# Patient Record
Sex: MALE | Race: WHITE | URBAN - METROPOLITAN AREA
[De-identification: names, ages, dates, MRNs, and addresses within clinical notes are randomized per-mention and may not be internally consistent; named-entity substitution may affect disease eponyms.]

---

## 2022-09-14 ENCOUNTER — TELEPHONE (OUTPATIENT)
Dept: PSYCHIATRY | Facility: CLINIC | Age: 67
End: 2022-09-14

## 2022-09-14 NOTE — TELEPHONE ENCOUNTER
Client left message reporting that he lost his Celexa and needs replacement  Reached out to pharmacy as client should be due for refills right about now anyways-pharmacy confirmed he is due and they have a RF on file they can prepare for him  Left vm to make client aware

## 2022-10-10 RX ORDER — GABAPENTIN 600 MG/1
600 TABLET ORAL 3 TIMES DAILY
COMMUNITY
End: 2022-10-13 | Stop reason: SDUPTHER

## 2022-10-10 RX ORDER — DOXEPIN HYDROCHLORIDE 75 MG/1
75 CAPSULE ORAL
COMMUNITY
End: 2022-10-13 | Stop reason: SDUPTHER

## 2022-10-10 RX ORDER — CITALOPRAM 40 MG/1
40 TABLET ORAL DAILY
COMMUNITY
End: 2022-10-13 | Stop reason: SDUPTHER

## 2022-10-10 RX ORDER — BUPROPION HYDROCHLORIDE 300 MG/1
300 TABLET ORAL DAILY
COMMUNITY
End: 2022-10-13 | Stop reason: SDUPTHER

## 2022-10-13 ENCOUNTER — TELEMEDICINE (OUTPATIENT)
Dept: PSYCHIATRY | Facility: CLINIC | Age: 67
End: 2022-10-13
Payer: MEDICARE

## 2022-10-13 DIAGNOSIS — F33.1 MAJOR DEPRESSIVE DISORDER, RECURRENT EPISODE, MODERATE (HCC): Primary | ICD-10-CM

## 2022-10-13 PROCEDURE — 99212 OFFICE O/P EST SF 10 MIN: CPT | Performed by: NURSE PRACTITIONER

## 2022-10-13 RX ORDER — BUPROPION HYDROCHLORIDE 300 MG/1
TABLET ORAL
Qty: 90 TABLET | Refills: 1 | Status: SHIPPED | OUTPATIENT
Start: 2022-10-13

## 2022-10-13 RX ORDER — DOXEPIN HYDROCHLORIDE 75 MG/1
CAPSULE ORAL
Qty: 75 CAPSULE | Refills: 1 | Status: SHIPPED | OUTPATIENT
Start: 2022-10-13

## 2022-10-13 RX ORDER — CITALOPRAM 40 MG/1
TABLET ORAL
Qty: 90 TABLET | Refills: 1 | Status: SHIPPED | OUTPATIENT
Start: 2022-10-13

## 2022-10-13 RX ORDER — GABAPENTIN 600 MG/1
TABLET ORAL
Qty: 270 TABLET | Refills: 1 | Status: SHIPPED | OUTPATIENT
Start: 2022-10-13

## 2022-10-13 NOTE — BH TREATMENT PLAN
TREATMENT PLAN (Medication Management Only)        [employer]    Name and Date of Birth:  Nik Parker 78 y o  1955  Date of Treatment Plan: October 13, 2022  Diagnosis/Diagnoses:    1  Major depressive disorder, recurrent episode, moderate (HCC)      Strengths/Personal Resources for Self-Care: supportive family, supportive friends  Area/Areas of need (in own words): depression  1  Long Term Goal: maintain depression  Target Date:6 months - 4/13/2023  Person/Persons responsible for completion of goal: Virgia Heimlich  2  Short Term Objective (s) - How will we reach this goal?:   A  Provider new recommended medication/dosage changes and/or continue medication(s): continue current medications as prescribed Celexa, Wellbutrin XL, Neurontin, Doxepin  B  N/A   C  N/A  Target Date:6 months - 4/13/2023  Person/Persons Responsible for Completion of Goal: Virgia Heimlich  Progress Towards Goals: stable  Treatment Modality: medication management every 4 months  Review due 180 days from date of this plan: 6 months - 4/13/2023  Expected length of service: maintenance  My Physician/PA/NP and I have developed this plan together and I agree to work on the goals and objectives  I understand the treatment goals that were developed for my treatment

## 2022-10-13 NOTE — PSYCH
Virtual Regular Visit    Verification of patient location:at home    Patient is located in the following state in which I hold an active license [place]      Assessment/Plan:       Diagnoses and all orders for this visit:    Major depressive disorder, recurrent episode, moderate (HCC)  -     buPROPion (WELLBUTRIN XL) 300 mg 24 hr tablet; Take 1 PO QD  -     citalopram (CeleXA) 40 mg tablet; Take 1 PO QD  -     doxepin (SINEquan) 75 MG capsule; Take 1 PO Q HS  -     gabapentin (NEURONTIN) 600 MG tablet; Take 1 PO TID    Other orders  -     Discontinue: citalopram (CeleXA) 40 mg tablet; Take 40 mg by mouth daily Take 1 PO QD  -     Discontinue: buPROPion (WELLBUTRIN XL) 300 mg 24 hr tablet; Take 300 mg by mouth daily Take 1 PO QD  -     Discontinue: doxepin (SINEquan) 75 MG capsule; Take 75 mg by mouth daily at bedtime Take 1 PO Q HS  -     Discontinue: gabapentin (NEURONTIN) 600 MG tablet; Take 600 mg by mouth 3 (three) times a day Take 1 PO TID          Goals addressed in session:   Good Health  Counseling provided:      Treatment Recommendations- Risks Benefits       Immediate Medical/Psychiatric/Psychotherapy Treatments and Any Precautions:     Risks, Benefits And Possible Side Effects Of Medications:  Risks, benefits, and possible side effects of medications explained to patient and patient verbalizes understanding    Controlled Medication Discussion: No records found for controlled prescriptions according to Yuan Christensen 17      Reason for visit is No chief complaint on file  Medication Management    Encounter provider LISA Wyman    [place] [address] [place] [address]  [phone]      Recent Visits  No visits were found meeting these conditions    Showing recent visits within past 7 days and meeting all other requirements  Today's Visits  Date Type Provider Dept   10/13/22 Telemedicine LISA Silverman [facility]   Showing today's visits and meeting all other requirements  Future Appointments  No visits were found meeting these conditions  Showing future appointments within next 150 days and meeting all other requirements       The patient was identified by name and date of birth  Esdras Connelly was informed that this is a telemedicine visit and that the [facility] and patient was informed that this is a secure, HIPAA-compliant platform  He agrees to proceed     My office door was closed  No one else was in the room  He acknowledged consent and understanding of privacy and security of the video platform  The patient has agreed to participate and understands they can discontinue the visit at any time  Patient is aware this is a billable service  Subjective    Esdras Connelly is a 78 y o  male    Here today for a med check  This was via [facility]    normal appetite      HPI  Mood "real good"  Denies anxiety  No problems with medication  Appetite Sleep good  Health OK  Denies SI/HI    No past medical history on file  No past surgical history on file  Current Outpatient Medications   Medication Sig Dispense Refill   • buPROPion (WELLBUTRIN XL) 300 mg 24 hr tablet Take 1 PO QD 90 tablet 1   • citalopram (CeleXA) 40 mg tablet Take 1 PO QD 90 tablet 1   • doxepin (SINEquan) 75 MG capsule Take 1 PO Q HS 75 capsule 1   • gabapentin (NEURONTIN) 600 MG tablet Take 1 PO  tablet 1     No current facility-administered medications for this visit  Not on File    Social History     Substance and Sexual Activity   Drug Use Not on file       No family history on file          Objective    Mental status:  Appearance calm and cooperative  and adequate hygiene and grooming   Mood mood appropriate   Affect affect appropriate    Speech a normal rate and fluent   Thought Processes normal thought processes   Hallucinations no hallucinations present    Thought Content no delusions   Abnormal Thoughts no suicidal thoughts  and no homicidal thoughts    Orientation  oriented to person and place and time   Remote Memory short term memory intact and long term memory intact   Attention Span concentration intact   Intellect Appears to be of Average Intelligence   Insight Insight intact   Judgement judgment was intact   Muscle Strength Muscle strength and tone were normal and Normal gait    Language no difficulty naming common objects   Fund of Knowledge displays adequate knowledge of current events   Pain none   Pain Scale 0       Video Exam    There were no vitals filed for this visit      I spent 15 minutes directly with the patient during this visit    Patient Instructions   Continue Current Tx  Report Problems  Return 4 months  Start 1PM    Stop 1:13  Total 13 minutes

## 2023-02-02 ENCOUNTER — TELEMEDICINE (OUTPATIENT)
Dept: PSYCHIATRY | Facility: CLINIC | Age: 68
End: 2023-02-02

## 2023-02-02 DIAGNOSIS — F33.1 MAJOR DEPRESSIVE DISORDER, RECURRENT EPISODE, MODERATE (HCC): ICD-10-CM

## 2023-02-02 RX ORDER — GABAPENTIN 600 MG/1
TABLET ORAL
Qty: 270 TABLET | Refills: 1 | Status: SHIPPED | OUTPATIENT
Start: 2023-02-02

## 2023-02-02 RX ORDER — BUPROPION HYDROCHLORIDE 300 MG/1
TABLET ORAL
Qty: 90 TABLET | Refills: 1 | Status: SHIPPED | OUTPATIENT
Start: 2023-02-02

## 2023-02-02 RX ORDER — DOXEPIN HYDROCHLORIDE 75 MG/1
CAPSULE ORAL
Qty: 90 CAPSULE | Refills: 1 | Status: SHIPPED | OUTPATIENT
Start: 2023-02-02

## 2023-02-02 RX ORDER — CITALOPRAM 40 MG/1
TABLET ORAL
Qty: 90 TABLET | Refills: 1 | Status: SHIPPED | OUTPATIENT
Start: 2023-02-02

## 2023-02-02 NOTE — PSYCH
Virtual Regular Visit    Verification of patient location: at home    Patient is located in the following state in which I hold an active license [place]      Assessment/Plan:       Diagnoses and all orders for this visit:    Major depressive disorder, recurrent episode, moderate (HCC)  -     gabapentin (NEURONTIN) 600 MG tablet; Take 1 PO TID  -     doxepin (SINEquan) 75 MG capsule; Take 1 PO Q HS  -     citalopram (CeleXA) 40 mg tablet; Take 1 PO QD  -     buPROPion (WELLBUTRIN XL) 300 mg 24 hr tablet; Take 1 PO QD          Goals addressed in session:   Good Health  Counseling provided:      Treatment Recommendations- Risks Benefits       Immediate Medical/Psychiatric/Psychotherapy Treatments and Any Precautions:     Risks, Benefits And Possible Side Effects Of Medications:  Risks, benefits, and possible side effects of medications explained to patient and patient verbalizes understanding    Controlled Medication Discussion: No records found for controlled prescriptions according to Yuan Christensen 17      Reason for visit is No chief complaint on file  Medication Management     Encounter provider LISA Torres    Provider [place] [address] [place] [address]  [phone]      Recent Visits  No visits were found meeting these conditions  Showing recent visits within past 7 days and meeting all other requirements  Today's Visits  Date Type Provider Dept   02/02/23 Telemedicine Mitch Torres [facility] today's visits and meeting all other requirements  Future Appointments  No visits were found meeting these conditions  Showing future appointments within next 150 days and meeting all other requirements       The patient was identified by name and date of birth   Radha Lagunas was informed that this is a telemedicine visit and that the [facility]  He agrees to proceed     My office door was closed  No one else was in the room  He acknowledged consent and understanding of privacy and security of the video platform  The patient has agreed to participate and understands they can discontinue the visit at any time  Patient is aware this is a billable service  Subjective    Vicki Ly is a 78 y o  male    Here today for a med check   This was via [address] [facility]    normal appetite      HPI  Mood good  Denies anxiety   No problems with medications  Lost 50lbs by riding his bike  Appetite Sleep good  Health OK  Denies SI/HI    No past medical history on file  No past surgical history on file  Current Outpatient Medications   Medication Sig Dispense Refill   • buPROPion (WELLBUTRIN XL) 300 mg 24 hr tablet Take 1 PO QD 90 tablet 1   • citalopram (CeleXA) 40 mg tablet Take 1 PO QD 90 tablet 1   • doxepin (SINEquan) 75 MG capsule Take 1 PO Q HS 90 capsule 1   • gabapentin (NEURONTIN) 600 MG tablet Take 1 PO  tablet 1     No current facility-administered medications for this visit  Not on File    Social History     Substance and Sexual Activity   Drug Use Not on file       No family history on file          Objective    Mental status:  Appearance calm and cooperative , adequate hygiene and grooming and good eye contact    Mood mood appropriate   Affect affect appropriate    Speech a normal rate and fluent   Thought Processes normal thought processes   Hallucinations no hallucinations present    Thought Content no delusions   Abnormal Thoughts no suicidal thoughts  and no homicidal thoughts    Orientation  oriented to person and place and time   Remote Memory short term memory intact and long term memory intact   Attention Span concentration intact   Intellect Appears to be of Average Intelligence   Insight Insight intact   Judgement judgment was intact   Muscle Strength Muscle strength and tone were normal and Normal gait    Language no difficulty naming common objects   Fund of Knowledge displays adequate knowledge of current events   Pain none   Pain Scale 0       Video Exam    There were no vitals filed for this visit      I spent  minutes directly with the patient during this visit    Patient Instructions   Continue Current Tx  Report Problems  Return 4 months       Visit Time    Visit Start Time: 1626  Visit Stop Time: 1320  Total Visit Duration: 15 min

## 2023-02-02 NOTE — BH TREATMENT PLAN
TREATMENT PLAN (Medication Management Only)        [facility]    Name and Date of Birth:  Chan Lester 78 y o  1955  Date of Treatment Plan: February 2, 2023  Diagnosis/Diagnoses:    1  Major depressive disorder, recurrent episode, moderate (HCC)      Strengths/Personal Resources for Self-Care: supportive family, supportive friends, taking medications as prescribed  Area/Areas of need (in own words): depression  1  Long Term Goal: maintain depression  Target Date:6 months - 8/2/2023  Person/Persons responsible for completion of goal: Denise Tovar  2  Short Term Objective (s) - How will we reach this goal?:   A  Provider new recommended medication/dosage changes and/or continue medication(s): continue current medications as prescribed Celexa, Wellbutrin XL, Doxepin, Neurontin  B  N/A   C  N/A  Target Date:6 months - 8/2/2023  Person/Persons Responsible for Completion of Goal: Walkermodesta Tovar  Progress Towards Goals: stable  Treatment Modality: medication management every 4 months  Review due 180 days from date of this plan: 6 months - 8/2/2023  Expected length of service: maintenance  My Physician/PA/NP and I have developed this plan together and I agree to work on the goals and objectives  I understand the treatment goals that were developed for my treatment

## 2023-05-22 PROBLEM — F33.1 MAJOR DEPRESSIVE DISORDER, RECURRENT EPISODE, MODERATE (HCC): Status: ACTIVE | Noted: 2023-05-22

## 2023-06-17 ENCOUNTER — DOCUMENTATION (OUTPATIENT)
Dept: PSYCHIATRY | Facility: CLINIC | Age: 68
End: 2023-06-17

## 2023-06-18 NOTE — PSYCH
[facility]    Patient Name Luís Solorio     Date of Birth: 78 y o  1955      MRN: [id number]    Admission Date: several years ago    Date of Transfer: June 17, 2023    Admission Diagnosis:     Major Depressive Disorder, recurrent, moderate    Current Diagnosis:     No diagnosis found  Reason for Admission: Carmela Arroyo presented for treatment due to depression  Primary complaints included DEPRESSIVE SYMPTOMS: unremarkable - euthymic mood  Progress in Treatment: Carmela Arroyo was seen for Medication Management  During the course of treatment he stated mood and anxiety are good  Lost 50 lbs riding his bike  Episodes of Higher Level of Care: No    Transfer request Initiated by: Psychiatrist: Nurse Practitioner Tejal Shelton Therapist: None    Reason for Transfer Request: clinician leaving practice    Does this individual need a clinician with specialized training/expertise?: No    Is this client working with any other [facility] Providers/Therapists?  Psychiatrist: None Therapist: None    Other pertinent issues: None    Are there any specific individuals who would be a “best fit” or who have already agreed to accept this transfer request?      Psychiatrist: None   Therapist: None  Rationale: Not Applicable    Attempts to maintain the current therapeutic relationship: Not Applicable    Transfer request routed to Clinical Coordinator for input and/or approval      Comments from other involved providers and/or clinical coordinator: None    Tejal Shelton, JAHNP06/17/23

## 2023-07-03 ENCOUNTER — TELEPHONE (OUTPATIENT)
Dept: PSYCHIATRY | Facility: CLINIC | Age: 68
End: 2023-07-03

## 2023-07-03 NOTE — TELEPHONE ENCOUNTER
Contacted client about cancelling appt. with LISA Dinero due to him retiring. We are in the process of bringing new providers into our practice. Once we have their schedules, we will contact you to reschedule your appt. Refills will be processed.   Any questions please call [phone]

## 2023-07-19 DIAGNOSIS — F33.1 MAJOR DEPRESSIVE DISORDER, RECURRENT EPISODE, MODERATE (HCC): ICD-10-CM

## 2023-07-19 RX ORDER — DOXEPIN HYDROCHLORIDE 75 MG/1
CAPSULE ORAL
Qty: 90 CAPSULE | Refills: 0 | Status: SHIPPED | OUTPATIENT
Start: 2023-07-19

## 2023-07-19 RX ORDER — GABAPENTIN 600 MG/1
TABLET ORAL
Qty: 270 TABLET | Refills: 0 | Status: SHIPPED | OUTPATIENT
Start: 2023-07-19

## 2023-07-19 RX ORDER — CITALOPRAM 40 MG/1
TABLET ORAL
Qty: 90 TABLET | Refills: 0 | Status: SHIPPED | OUTPATIENT
Start: 2023-07-19

## 2023-07-19 RX ORDER — BUPROPION HYDROCHLORIDE 300 MG/1
TABLET ORAL
Qty: 90 TABLET | Refills: 0 | Status: SHIPPED | OUTPATIENT
Start: 2023-07-19

## 2023-07-19 NOTE — TELEPHONE ENCOUNTER
Pt requested refills of the bupropion, citalopram, doxepin, and gabapentin    [facility] not scheduled with new provider yet

## 2023-09-15 ENCOUNTER — TELEPHONE (OUTPATIENT)
Dept: PSYCHIATRY | Facility: CLINIC | Age: 68
End: 2023-09-15

## 2023-09-15 NOTE — TELEPHONE ENCOUNTER
Spoke to client regarding scheduling with a new provider. Was not a good time right now and he asked to be called back on Monday.

## 2023-09-18 ENCOUNTER — TELEPHONE (OUTPATIENT)
Dept: PSYCHIATRY | Facility: CLINIC | Age: 68
End: 2023-09-18

## 2023-09-19 DIAGNOSIS — F33.1 MAJOR DEPRESSIVE DISORDER, RECURRENT EPISODE, MODERATE (HCC): ICD-10-CM

## 2023-09-19 RX ORDER — GABAPENTIN 600 MG/1
TABLET ORAL
Qty: 270 TABLET | Refills: 0 | Status: SHIPPED | OUTPATIENT
Start: 2023-09-19

## 2023-09-19 RX ORDER — BUPROPION HYDROCHLORIDE 300 MG/1
TABLET ORAL
Qty: 90 TABLET | Refills: 0 | Status: SHIPPED | OUTPATIENT
Start: 2023-09-19

## 2023-09-19 RX ORDER — DOXEPIN HYDROCHLORIDE 75 MG/1
CAPSULE ORAL
Qty: 90 CAPSULE | Refills: 0 | Status: SHIPPED | OUTPATIENT
Start: 2023-09-19

## 2023-09-19 RX ORDER — CITALOPRAM 40 MG/1
TABLET ORAL
Qty: 90 TABLET | Refills: 0 | Status: SHIPPED | OUTPATIENT
Start: 2023-09-19

## 2023-09-19 NOTE — TELEPHONE ENCOUNTER
Can you sign off on refills before pt is seen next month     Will now be seeing Dr Rosalia Hernandez but she's out of the office until next week

## 2023-10-18 ENCOUNTER — TELEMEDICINE (OUTPATIENT)
Dept: PSYCHIATRY | Facility: CLINIC | Age: 68
End: 2023-10-18

## 2023-10-18 DIAGNOSIS — F33.1 MAJOR DEPRESSIVE DISORDER, RECURRENT EPISODE, MODERATE (HCC): Primary | ICD-10-CM

## 2023-10-18 NOTE — PSYCH
Virtual Regular Visit    Verification of patient location:    Patient is located at Home in the following state in which I hold an active license [place]      Assessment/Plan:    Problem List Items Addressed This Visit    None      Goals addressed in session: Goal 1          Reason for visit is   Chief Complaint   Patient presents with    Medication Management        Encounter provider Jorden Najjar, MD    Provider located at [address] [place] [address],[address]  [address]  [phone]      Recent Visits  No visits were found meeting these conditions. Showing recent visits within past 7 days and meeting all other requirements  Today's Visits  Date Type Provider Dept   10/18/23 Telemedicine Jorden Najjar, [address] today's visits and meeting all other requirements  Future Appointments  No visits were found meeting these conditions. Showing future appointments within next 150 days and meeting all other requirements       The patient was identified by name and date of birth. Pierre Camp was informed that this is a telemedicine visit and that [facility]. He agrees to proceed. .  My office door was closed. No one else was in the room. He acknowledged consent and understanding of privacy and security of the video platform. The patient has agreed to participate and understands they can discontinue the visit at any time. Patient is aware this is a billable service. Subjective  Pierre Camp is a 80 y.o. male with depression, anxiety, insomnia presents for appt .   This is a transfer from [address] [place] [address]; I reviewed the chart  Pt reports h/o depression " on and off" since HS; being " a trouble maker ; poor grades in school"; denies h/o mishel  H/o severe alcohol abuse since 25 y.o - stopped by himself in 2019 - sober since  Admission in 2002 for severe depression after " bad break up" - started on current meds with good effect; denies SE  Denies acute / chronic medical problems  Pt lives alone, no children. He rides a bike daily - 12 miles; likes music; collects watches and headphones; social with friends  FH of alcohol abuse ( mother)  Pt has brother and sister - "seeing them on holidays"      HPI   Mood - reports as good and stable; does ADLs, social and active; functions well at baseline  Anxiety - controlled; denies panic attacks or racing thoughts  Denies anger or psychotic spx    History reviewed. No pertinent past medical history. History reviewed. No pertinent surgical history. Current Outpatient Medications   Medication Sig Dispense Refill    buPROPion (WELLBUTRIN XL) 300 mg 24 hr tablet Take 1 PO QD 90 tablet 0    citalopram (CeleXA) 40 mg tablet Take 1 PO QD 90 tablet 0    doxepin (SINEquan) 75 MG capsule Take 1 PO Q HS 90 capsule 0    gabapentin (NEURONTIN) 600 MG tablet Take 1 PO  tablet 0     No current facility-administered medications for this visit. Not on File    Review of Systems   Constitutional:  Negative for activity change and appetite change. Psychiatric/Behavioral:  Negative for dysphoric mood, sleep disturbance and suicidal ideas. The patient is not nervous/anxious. Video Exam    There were no vitals filed for this visit. Physical Exam  Constitutional:       Appearance: Normal appearance. He is normal weight. Neurological:      Mental Status: He is alert. Psychiatric:         Attention and Perception: Attention and perception normal.         Mood and Affect: Mood and affect normal.         Speech: Speech normal.         Behavior: Behavior normal. Behavior is cooperative. Thought Content:  Thought content normal.         Judgment: Judgment normal.          Visit Time    Visit Start Time: 10.58 am   Visit Stop Time: 11.20 am  Total Visit Duration:  40 minutes    TREATMENT PLAN (Medication Management Only)        [facility] [address] [facility]    Name and Date of Birth:  Patrice Mcdonald 80 y.o. 1955  Date of Treatment Plan: October 18, 2023  Diagnosis/Diagnoses:  No diagnosis found. Strengths/Personal Resources for Self-Care: taking medications as prescribed, motivation for treatment. Area/Areas of need (in own words): anxiety, depression  1. Long Term Goal: maintain stability of depression. Target Date:6 months - 4/18/2024  Person/Persons responsible for completion of goal: Claude Gouty  2. Short Term Objective (s) - How will we reach this goal?:   A. Provider new recommended medication/dosage changes and/or continue medication(s): continue current medications as prescribed Celexa, Wellbutrin XL, Neurontin, Doxepin. B. N/A.  C. N/A. Target Date:6 months - 4/18/2024  Person/Persons Responsible for Completion of Goal: Claude Gouty  Progress Towards Goals: stable  Treatment Modality: medication management every 3 months  Review due 180 days from date of this plan: 6 months - 4/18/2024  Expected length of service: maintenance  My Physician/PA/NP and I have developed this plan together and I agree to work on the goals and objectives. I understand the treatment goals that were developed for my treatment.

## 2024-01-10 ENCOUNTER — TELEMEDICINE (OUTPATIENT)
Dept: PSYCHIATRY | Facility: CLINIC | Age: 69
End: 2024-01-10
Payer: MEDICARE

## 2024-01-10 DIAGNOSIS — F41.1 GENERALIZED ANXIETY DISORDER: Primary | ICD-10-CM

## 2024-01-10 DIAGNOSIS — F33.1 MAJOR DEPRESSIVE DISORDER, RECURRENT EPISODE, MODERATE (HCC): ICD-10-CM

## 2024-01-10 DIAGNOSIS — G47.00 INSOMNIA, UNSPECIFIED TYPE: ICD-10-CM

## 2024-01-10 PROCEDURE — 99214 OFFICE O/P EST MOD 30 MIN: CPT | Performed by: PSYCHIATRY & NEUROLOGY

## 2024-01-10 RX ORDER — BUPROPION HYDROCHLORIDE 300 MG/1
TABLET ORAL
Qty: 90 TABLET | Refills: 1 | Status: SHIPPED | OUTPATIENT
Start: 2024-01-10

## 2024-01-10 RX ORDER — CITALOPRAM 40 MG/1
TABLET ORAL
Qty: 90 TABLET | Refills: 1 | Status: SHIPPED | OUTPATIENT
Start: 2024-01-10

## 2024-01-10 RX ORDER — GABAPENTIN 600 MG/1
TABLET ORAL
Qty: 270 TABLET | Refills: 1 | Status: SHIPPED | OUTPATIENT
Start: 2024-01-10

## 2024-01-10 RX ORDER — DOXEPIN HYDROCHLORIDE 75 MG/1
CAPSULE ORAL
Qty: 90 CAPSULE | Refills: 1 | Status: SHIPPED | OUTPATIENT
Start: 2024-01-10

## 2024-01-10 NOTE — PSYCH
Virtual Regular Visit    Verification of patient location:    Patient is located at Home in the following state in which I hold an active license [place]      Assessment/Plan:    Problem List Items Addressed This Visit    None      Goals addressed in session: Goal 1          Reason for visit is   Chief Complaint   Patient presents with    Medication Management        Encounter provider Nayana Simmons MD    Provider located at [facility]  [address]  [facility] [id number] [phone]      Recent Visits  No visits were found meeting these conditions.  Showing recent visits within past 7 days and meeting all other requirements  Today's Visits  Date Type Provider Dept   01/10/24 Telemedicine Nayana Simmons MD [facility]   Showing today's visits and meeting all other requirements  Future Appointments  No visits were found meeting these conditions.  Showing future appointments within next 150 days and meeting all other requirements       The patient was identified by name and date of birth. Kimani Floyd was informed that this is a telemedicine visit and that the visit is being conducted throughthe Epic Embedded platform. He agrees to proceed..  My office door was closed. No one else was in the room.  He acknowledged consent and understanding of privacy and security of the video platform. The patient has agreed to participate and understands they can discontinue the visit at any time.    Patient is aware this is a billable service.     Subjective  Kimani Floyd is a 68 y.o. male with depression, anxiety, insomnia presents for regular f/u  .  Compliant with meds, denies SE  Denies acute medical problems  Physically active, keeps busy      HPI   Mood - reports as good and stable; active, denies mood swings; does ADLs, functions at baseline  Anxiety - controlled; denies racing thoughts or panic attacks  History reviewed. No pertinent past medical  history.    History reviewed. No pertinent surgical history.    Current Outpatient Medications   Medication Sig Dispense Refill    buPROPion (WELLBUTRIN XL) 300 mg 24 hr tablet Take 1 PO QD 90 tablet 0    citalopram (CeleXA) 40 mg tablet Take 1 PO QD 90 tablet 0    doxepin (SINEquan) 75 MG capsule Take 1 PO Q HS 90 capsule 0    gabapentin (NEURONTIN) 600 MG tablet Take 1 PO  tablet 0     No current facility-administered medications for this visit.        Not on File    Review of Systems   Constitutional:  Negative for activity change.   Psychiatric/Behavioral:  Negative for dysphoric mood, sleep disturbance and suicidal ideas. The patient is not nervous/anxious.        Video Exam    There were no vitals filed for this visit.    Physical Exam  Constitutional:       Appearance: Normal appearance. He is normal weight.   Neurological:      Mental Status: He is alert.   Psychiatric:         Attention and Perception: Attention and perception normal.         Mood and Affect: Mood and affect normal.         Speech: Speech normal.         Behavior: Behavior normal. Behavior is cooperative.         Thought Content: Thought content normal.         Judgment: Judgment normal.          Visit Time    Visit Start Time: 10.54 am  Visit Stop Time: 11.05 am   Total Visit Duration:  20 minutes    TREATMENT PLAN (Medication Management Only)        [facility]    Name and Date of Birth:  Kimani Floyd 68 y.o. 1955  Date of Treatment Plan: January 10, 2024  Diagnosis/Diagnoses:  No diagnosis found.  Strengths/Personal Resources for Self-Care: taking medications as prescribed, motivation for treatment.  Area/Areas of need (in own words): anxiety, depression  1. Long Term Goal: maintain stability of depression.  Target Date:6 months - 7/10/2024  Person/Persons responsible for completion of goal: Kimani  2.  Short Term Objective (s) - How will we reach this goal?:   A. Provider  new recommended medication/dosage changes and/or continue medication(s): continue current medications as prescribed Celexa, Wellbutrin XL, Neurontin, Doxepin.  B. N/A.  C. N/A.  Target Date:6 months - 7/10/2024  Person/Persons Responsible for Completion of Goal: Kimani  Progress Towards Goals: stable  Treatment Modality: medication management every 6 months  Review due 180 days from date of this plan: 6 months - 7/10/2024  Expected length of service: maintenance  My Physician/PA/NP and I have developed this plan together and I agree to work on the goals and objectives. I understand the treatment goals that were developed for my treatment.

## 2024-06-25 ENCOUNTER — TELEMEDICINE (OUTPATIENT)
Dept: PSYCHIATRY | Facility: CLINIC | Age: 69
End: 2024-06-25
Payer: MEDICARE

## 2024-06-25 DIAGNOSIS — F33.1 MAJOR DEPRESSIVE DISORDER, RECURRENT EPISODE, MODERATE (HCC): Primary | ICD-10-CM

## 2024-06-25 DIAGNOSIS — G47.00 INSOMNIA, UNSPECIFIED TYPE: ICD-10-CM

## 2024-06-25 DIAGNOSIS — F41.1 GENERALIZED ANXIETY DISORDER: ICD-10-CM

## 2024-06-25 PROCEDURE — 99214 OFFICE O/P EST MOD 30 MIN: CPT | Performed by: PSYCHIATRY & NEUROLOGY

## 2024-06-25 RX ORDER — DOXEPIN HYDROCHLORIDE 75 MG/1
CAPSULE ORAL
Qty: 90 CAPSULE | Refills: 1 | Status: SHIPPED | OUTPATIENT
Start: 2024-06-25

## 2024-06-25 RX ORDER — BUPROPION HYDROCHLORIDE 300 MG/1
TABLET ORAL
Qty: 90 TABLET | Refills: 1 | Status: SHIPPED | OUTPATIENT
Start: 2024-06-25

## 2024-06-25 RX ORDER — CITALOPRAM 40 MG/1
TABLET ORAL
Qty: 90 TABLET | Refills: 1 | Status: SHIPPED | OUTPATIENT
Start: 2024-06-25

## 2024-06-25 RX ORDER — GABAPENTIN 600 MG/1
TABLET ORAL
Qty: 270 TABLET | Refills: 1 | Status: SHIPPED | OUTPATIENT
Start: 2024-06-25

## 2024-06-25 NOTE — PSYCH
Virtual Regular Visit    Verification of patient location:    Patient is located at Home in the following state in which I hold an active license [place]      Assessment/Plan:    Problem List Items Addressed This Visit       Major depressive disorder, recurrent episode, moderate (HCC) - Primary     Other Visit Diagnoses       Generalized anxiety disorder        Insomnia, unspecified type                Goals addressed in session: Goal 1          Reason for visit is   Chief Complaint   Patient presents with    Medication Management        Encounter provider Nayana Simmons MD      Recent Visits  No visits were found meeting these conditions.  Showing recent visits within past 7 days and meeting all other requirements  Today's Visits  Date Type Provider Dept   06/25/24 Telemedicine Nayana Simmons MD [facility]   Showing today's visits and meeting all other requirements  Future Appointments  No visits were found meeting these conditions.  Showing future appointments within next 150 days and meeting all other requirements       The patient was identified by name and date of birth. Kimani Floyd was informed that this is a telemedicine visit and that the visit is being conducted throughthe Epic Embedded platform. He agrees to proceed..  My office door was closed. No one else was in the room.  He acknowledged consent and understanding of privacy and security of the video platform. The patient has agreed to participate and understands they can discontinue the visit at any time.    Patient is aware this is a billable service.     Subjective  Kimani Floyd is a 68 y.o. male with depression and anxiety presents for regular f/u  .  Compliant with meds, denies SE  Pt is physically active, riding a bike daily, long distance  No recent check up or blood work; no complaints  Pt lives alone  HPI   Mood - reports as good and stable; good energy and motivation; active, productive, social  Anxiety - controlled; denies panic  attacks or racing thoughts  Sleep - good  History reviewed. No pertinent past medical history.    History reviewed. No pertinent surgical history.    Current Outpatient Medications   Medication Sig Dispense Refill    buPROPion (WELLBUTRIN XL) 300 mg 24 hr tablet Take 1 PO QD 90 tablet 1    citalopram (CeleXA) 40 mg tablet Take 1 PO QD 90 tablet 1    doxepin (SINEquan) 75 MG capsule Take 1 PO Q HS 90 capsule 1    gabapentin (NEURONTIN) 600 MG tablet Take 1 PO  tablet 1     No current facility-administered medications for this visit.        Not on File    Review of Systems   Constitutional:  Negative for activity change and appetite change.   Psychiatric/Behavioral:  Negative for dysphoric mood, sleep disturbance and suicidal ideas. The patient is not nervous/anxious.        Video Exam    There were no vitals filed for this visit.    Physical Exam  Constitutional:       Appearance: Normal appearance. He is normal weight.   Neurological:      Mental Status: He is alert.   Psychiatric:         Attention and Perception: Attention and perception normal.         Mood and Affect: Mood and affect normal.         Speech: Speech normal.         Behavior: Behavior normal. Behavior is cooperative.         Thought Content: Thought content normal.         Judgment: Judgment normal.          Visit Time    Visit Start Time: 10.59 am   Visit Stop Time: 11.06 am   Total Visit Duration:  20 minutes  TREATMENT PLAN (Medication Management Only)        [facility]    Name and Date of Birth:  Kimani Floyd 68 y.o. 1955  Date of Treatment Plan: June 25, 2024  Diagnosis/Diagnoses:    1. Major depressive disorder, recurrent episode, moderate (HCC)    2. Generalized anxiety disorder    3. Insomnia, unspecified type      Strengths/Personal Resources for Self-Care: taking medications as prescribed, good physical health.  Area/Areas of need (in own words): anxiety, depression, sleep  problems  1. Long Term Goal: maintain control of depression.  Target Date:6 months - 12/25/2024  Person/Persons responsible for completion of goal: Kimani  2.  Short Term Objective (s) - How will we reach this goal?:   A. Provider new recommended medication/dosage changes and/or continue medication(s): continue current medications as prescribed Celexa, Wellbutrin XL, Neurontin, Doxepin.  B. N/A.  C. N/A.  Target Date:6 months - 12/25/2024  Person/Persons Responsible for Completion of Goal: Kimani  Progress Towards Goals: stable  Treatment Modality: medication management every 6 months  Review due 180 days from date of this plan: 6 months - 12/25/2024  Expected length of service: maintenance  My Physician/PA/NP and I have developed this plan together and I agree to work on the goals and objectives. I understand the treatment goals that were developed for my treatment.

## 2024-09-30 DIAGNOSIS — F33.1 MAJOR DEPRESSIVE DISORDER, RECURRENT EPISODE, MODERATE (HCC): ICD-10-CM

## 2024-09-30 RX ORDER — DOXEPIN HYDROCHLORIDE 75 MG/1
CAPSULE ORAL
Qty: 90 CAPSULE | Refills: 0 | Status: SHIPPED | OUTPATIENT
Start: 2024-09-30

## 2024-12-03 ENCOUNTER — TELEPHONE (OUTPATIENT)
Dept: PSYCHIATRY | Facility: CLINIC | Age: 69
End: 2024-12-03

## 2024-12-03 NOTE — TELEPHONE ENCOUNTER
Writer called and left detailed voicemail on where to find the yearly forms required for client's appointment 12/17/24.

## 2024-12-13 NOTE — TELEPHONE ENCOUNTER
Writer called and left another detailed voicemail for client to help direct him to his required paperwork on the [employer] and to make him aware the appointment cannot be held on 12/17/24 if the forms are not signed by the start time of the appointment.

## 2024-12-17 ENCOUNTER — TELEMEDICINE (OUTPATIENT)
Dept: PSYCHIATRY | Facility: CLINIC | Age: 69
End: 2024-12-17
Payer: MEDICARE

## 2024-12-17 ENCOUNTER — TELEPHONE (OUTPATIENT)
Dept: PSYCHIATRY | Facility: CLINIC | Age: 69
End: 2024-12-17

## 2024-12-17 DIAGNOSIS — G47.00 INSOMNIA, UNSPECIFIED TYPE: ICD-10-CM

## 2024-12-17 DIAGNOSIS — F41.1 GENERALIZED ANXIETY DISORDER: ICD-10-CM

## 2024-12-17 DIAGNOSIS — Z00.6 ENCOUNTER FOR EXAMINATION FOR NORMAL COMPARISON OR CONTROL IN CLINICAL RESEARCH PROGRAM: ICD-10-CM

## 2024-12-17 DIAGNOSIS — F33.1 MAJOR DEPRESSIVE DISORDER, RECURRENT EPISODE, MODERATE (HCC): Primary | ICD-10-CM

## 2024-12-17 PROCEDURE — 99214 OFFICE O/P EST MOD 30 MIN: CPT | Performed by: PSYCHIATRY & NEUROLOGY

## 2024-12-17 RX ORDER — GABAPENTIN 600 MG/1
TABLET ORAL
Qty: 270 TABLET | Refills: 1 | Status: SHIPPED | OUTPATIENT
Start: 2024-12-17

## 2024-12-17 RX ORDER — BUPROPION HYDROCHLORIDE 300 MG/1
TABLET ORAL
Qty: 90 TABLET | Refills: 1 | Status: SHIPPED | OUTPATIENT
Start: 2024-12-17

## 2024-12-17 RX ORDER — DOXEPIN HYDROCHLORIDE 75 MG/1
CAPSULE ORAL
Qty: 90 CAPSULE | Refills: 1 | Status: SHIPPED | OUTPATIENT
Start: 2024-12-17

## 2024-12-17 RX ORDER — CITALOPRAM HYDROBROMIDE 40 MG/1
TABLET ORAL
Qty: 90 TABLET | Refills: 1 | Status: SHIPPED | OUTPATIENT
Start: 2024-12-17

## 2024-12-17 NOTE — PSYCH
Virtual Regular Visit    Verification of patient location:    Patient is located at Home in the following state in which I hold an active license [place]      Assessment/Plan:  Assessment & Plan  Major depressive disorder, recurrent episode, moderate (HCC)    Orders:    buPROPion (WELLBUTRIN XL) 300 mg 24 hr tablet; TAKE 1 TABLET BY MOUTH EVERY DAY    citalopram (CeleXA) 40 mg tablet; TAKE 1 TABLET BY MOUTH EVERY DAY    doxepin (SINEquan) 75 MG capsule; Take 1 PO Q HS    gabapentin (NEURONTIN) 600 MG tablet; Take 1 PO TID    Generalized anxiety disorder  Gabapentin 600 mg tid       Insomnia, unspecified type  Doxeptin 75 mg hs          Problem List Items Addressed This Visit       Major depressive disorder, recurrent episode, moderate (HCC) - Primary     Other Visit Diagnoses         Generalized anxiety disorder          Insomnia, unspecified type                Goals addressed in session: Goal 1     Depression Follow-up Plan Completed: Not applicable    Reason for visit is   Chief Complaint   Patient presents with    Medication Management        Encounter provider Nayana Simmons MD      Recent Visits  No visits were found meeting these conditions.  Showing recent visits within past 7 days and meeting all other requirements  Today's Visits  Date Type Provider Dept   12/17/24 Telephone [facility]   12/17/24 Telemedicine Nayana Simmons MD [facility]   Showing today's visits and meeting all other requirements  Future Appointments  No visits were found meeting these conditions.  Showing future appointments within next 150 days and meeting all other requirements       The patient was identified by name and date of birth. Kimani Floyd was informed that this is a telemedicine visit and that the visit is being conducted throughthe Epic Embedded platform. He agrees to proceed..  My office door was closed. No one else was in the room.  He acknowledged consent and understanding of  privacy and security of the video platform. The patient has agreed to participate and understands they can discontinue the visit at any time.    Patient is aware this is a billable service.     Subjective  Kimani Floyd is a 69 y.o. male with depression, anxiety, insomnia presents for regular f/u  .  Compliant with meds, denies SE  I reviewed the chart  Pt denies acute medical problems; no recent check up or blood work  Pt is physically active; riding a bike daily; 15-20 miles  He is sober for 5 years ( h/o alcohol abuse); lost 50 lbs    HPI   Mood - reports as good and stable; denies depression  Good energy and motivation; does ADLs; active  Anxiety - controlled; denies panic attacks or racing thoughts  Sleep - good  History reviewed. No pertinent past medical history.    History reviewed. No pertinent surgical history.    Current Outpatient Medications   Medication Sig Dispense Refill    doxepin (SINEquan) 75 MG capsule Take 1 PO Q HS 90 capsule 0    buPROPion (WELLBUTRIN XL) 300 mg 24 hr tablet TAKE 1 TABLET BY MOUTH EVERY DAY 90 tablet 0    citalopram (CeleXA) 40 mg tablet TAKE 1 TABLET BY MOUTH EVERY DAY 90 tablet 0    gabapentin (NEURONTIN) 600 MG tablet Take 1 PO  tablet 1     No current facility-administered medications for this visit.        Not on File    Review of Systems   Constitutional:  Negative for activity change and appetite change.   Psychiatric/Behavioral:  Negative for dysphoric mood, sleep disturbance and suicidal ideas. The patient is not nervous/anxious.        Video Exam    There were no vitals filed for this visit.    Physical Exam  Constitutional:       Appearance: Normal appearance. He is normal weight.   Neurological:      Mental Status: He is alert.   Psychiatric:         Attention and Perception: Attention and perception normal.         Mood and Affect: Mood and affect normal.         Speech: Speech normal.         Behavior: Behavior normal. Behavior is cooperative.          Thought Content: Thought content normal.         Judgment: Judgment normal.          Visit Time  Face to face  Visit Start Time: 11.00 am   Visit Stop Time: 11.08 am   Total Visit Duration:  20 minutes total spent in patient care

## 2024-12-17 NOTE — ASSESSMENT & PLAN NOTE
Orders:    buPROPion (WELLBUTRIN XL) 300 mg 24 hr tablet; TAKE 1 TABLET BY MOUTH EVERY DAY    citalopram (CeleXA) 40 mg tablet; TAKE 1 TABLET BY MOUTH EVERY DAY    doxepin (SINEquan) 75 MG capsule; Take 1 PO Q HS    gabapentin (NEURONTIN) 600 MG tablet; Take 1 PO TID

## 2024-12-17 NOTE — TELEPHONE ENCOUNTER
Writer called and left detailed voicemail describing where to find the remaining forms that did not come through from the MyChart and asking client to call back as soon as possible if he cannot find them. Writer reminded client they must be signed by 11:00 (time of appointment) or his appointment will have to be rescheduled.

## 2024-12-17 NOTE — TELEPHONE ENCOUNTER
Writer called and scheduled 6 month follow up with Dr. Simmons and let him know about ER contact needing to be done on the [facility] consent form.

## 2024-12-17 NOTE — BH TREATMENT PLAN
TREATMENT PLAN (Medication Management Only)        [facility]    Name and Date of Birth:  Kimani Floyd 69 y.o. 1955  Date of Treatment Plan: December 17, 2024  Diagnosis/Diagnoses:    1. Major depressive disorder, recurrent episode, moderate (HCC)    2. Generalized anxiety disorder    3. Insomnia, unspecified type      Strengths/Personal Resources for Self-Care: taking medications as prescribed, good physical health.  Area/Areas of need (in own words): anxiety, depression, sleep problems  1. Long Term Goal: maintain mood stability.  Target Date:6 months - 6/17/2025  Person/Persons responsible for completion of goal: Kimani  2.  Short Term Objective (s) - How will we reach this goal?:   A. Provider new recommended medication/dosage changes and/or continue medication(s): continue current medications as prescribed Celexa, Wellbutrin XL, Neurontin, Doxepin.  B. N/A.  C. N/A.  Target Date:6 months - 6/17/2025  Person/Persons Responsible for Completion of Goal: Kimani  Progress Towards Goals: stable  Treatment Modality: medication management every 6 months  Review due 180 days from date of this plan: 6 months - 6/17/2025  Expected length of service: maintenance  My Physician/PA/NP and I have developed this plan together and I agree to work on the goals and objectives. I understand the treatment goals that were developed for my treatment.

## 2025-03-26 ENCOUNTER — TELEPHONE (OUTPATIENT)
Dept: PSYCHIATRY | Facility: CLINIC | Age: 70
End: 2025-03-26

## 2025-03-26 NOTE — TELEPHONE ENCOUNTER
Writer called to reschedule client's appointment with Dr. Simmons due to provider being out of office, client requested call back next week. Writer took note to call him next week.

## 2025-04-22 ENCOUNTER — DOCUMENTATION (OUTPATIENT)
Dept: PSYCHIATRY | Facility: CLINIC | Age: 70
End: 2025-04-22

## 2025-05-12 ENCOUNTER — TELEPHONE (OUTPATIENT)
Dept: PSYCHIATRY | Facility: CLINIC | Age: 70
End: 2025-05-12

## 2025-05-12 NOTE — TELEPHONE ENCOUNTER
Writer called and left voicemail to remind client that the appointment with Dr. Simmons client was scheduled for in June still has to be rescheduled.

## 2025-06-09 ENCOUNTER — TELEPHONE (OUTPATIENT)
Dept: PSYCHIATRY | Facility: CLINIC | Age: 70
End: 2025-06-09

## 2025-06-09 ENCOUNTER — TELEMEDICINE (OUTPATIENT)
Dept: PSYCHIATRY | Facility: CLINIC | Age: 70
End: 2025-06-09
Payer: MEDICARE

## 2025-06-09 DIAGNOSIS — G47.00 INSOMNIA, UNSPECIFIED TYPE: ICD-10-CM

## 2025-06-09 DIAGNOSIS — F41.1 GENERALIZED ANXIETY DISORDER: Primary | ICD-10-CM

## 2025-06-09 DIAGNOSIS — F33.1 MAJOR DEPRESSIVE DISORDER, RECURRENT EPISODE, MODERATE (HCC): ICD-10-CM

## 2025-06-09 PROCEDURE — 99214 OFFICE O/P EST MOD 30 MIN: CPT | Performed by: PSYCHIATRY & NEUROLOGY

## 2025-06-09 RX ORDER — BUPROPION HYDROCHLORIDE 300 MG/1
TABLET ORAL
Qty: 90 TABLET | Refills: 1 | Status: SHIPPED | OUTPATIENT
Start: 2025-06-09

## 2025-06-09 RX ORDER — CITALOPRAM HYDROBROMIDE 40 MG/1
TABLET ORAL
Qty: 90 TABLET | Refills: 1 | Status: SHIPPED | OUTPATIENT
Start: 2025-06-09

## 2025-06-09 RX ORDER — DOXEPIN HYDROCHLORIDE 75 MG/1
CAPSULE ORAL
Qty: 90 CAPSULE | Refills: 1 | Status: SHIPPED | OUTPATIENT
Start: 2025-06-09

## 2025-06-09 RX ORDER — GABAPENTIN 600 MG/1
TABLET ORAL
Qty: 270 TABLET | Refills: 1 | Status: SHIPPED | OUTPATIENT
Start: 2025-06-09

## 2025-06-09 NOTE — PSYCH
"MEDICATION MANAGEMENT NOTE    Name: Kimani Floyd      : 1955      MRN: [id number]  Encounter Provider: Nayana Simmons MD  Encounter Date: 2025   Encounter department: [facility] OUTPATIENT    Insurance: Payor: MEDICARE / Plan: MEDICARE A AND B / Product Type: Medicare A & B Fee for Service /      Reason for Visit:   Chief Complaint   Patient presents with    Medication Management   :  Assessment & Plan  Major depressive disorder, recurrent episode, moderate (HCC)    Orders:    gabapentin (NEURONTIN) 600 MG tablet; Take 1 PO TID    doxepin (SINEquan) 75 MG capsule; Take 1 PO Q HS    citalopram (CeleXA) 40 mg tablet; TAKE 1 TABLET BY MOUTH EVERY DAY    buPROPion (WELLBUTRIN XL) 300 mg 24 hr tablet; TAKE 1 TABLET BY MOUTH EVERY DAY    Generalized anxiety disorder  Neurontin 600 mg tid       Insomnia, unspecified type  Doxepin 75 mg hs           Treatment Recommendations:    Educated about diagnosis and treatment modalities. Verbalizes understanding and agreement with the treatment plan.  Discussed self monitoring of symptoms, and symptom monitoring tools.  Discussed medications and if treatment adjustment was needed or desired.  I am scheduling this patient out for greater than 3 months: Yes - Patient's stability of symptoms warrant this length of time or no significant changes to treatment plan    Medications Risks/Benefits:      Risks, Benefits And Possible Side Effects Of Medications:    Risks, benefits, and possible side effects of medications explained to kimani and he (or legal representative) verbalizes understanding and agreement for treatment.    Controlled Medication Discussion:     Not applicable      History of Present Illness       Pt presents for regular f/u .  Compliant with meds, denies SE  Pt denies acute medical problems or other stressors  He rides a bike daily, 17-20 miles    Pt reports h/o depression \" on and off\" since HS; being \" a trouble maker ; poor " "grades in school; denies h/o mishel  H/o severe alcohol abuse since 22 y.o - stopped by himself in 2019 - sober since  Admission in 2002 for severe depression after \" bad break up\" - started on current meds with good effect; denies SE  Pt lives alone, no children. He rides a bike daily ; likes music; collects watches and headphones; social with friends  FH of alcohol abuse ( mother)  Pt has brother and sister - \"seeing them on holidays\"    Mood - reports as good and stable; good energy and motivation, does ADLs, functions at baseline  Anxiety - controlled; denies panic attacks or racing thoughts  Sleep, appetite - normal  Review Of Systems: A review of systems is obtained and is negative except for the pertinent positives listed in HPI/Subjective above.      Current Rating Scores:         Areas of Improvement: reviewed in HPI/Subjective Section      Past Medical History[1]  Past Surgical History[2]  Allergies: Allergies[3]    Current Outpatient Medications   Medication Instructions    buPROPion (WELLBUTRIN XL) 300 mg 24 hr tablet TAKE 1 TABLET BY MOUTH EVERY DAY    citalopram (CeleXA) 40 mg tablet TAKE 1 TABLET BY MOUTH EVERY DAY    doxepin (SINEquan) 75 MG capsule Take 1 PO Q HS    gabapentin (NEURONTIN) 600 MG tablet Take 1 PO TID        Substance Abuse History:    Tobacco, Alcohol and Drug Use History     Tobacco Use    Smoking status: Not on file    Smokeless tobacco: Not on file   Substance Use Topics    Alcohol use: Not on file    Drug use: Not on file          Social History:    Social History     Socioeconomic History    Marital status: Single     Spouse name: Not on file    Number of children: Not on file    Years of education: Not on file    Highest education level: Not on file   Occupational History    Not on file   Other Topics Concern    Not on file   Social History Narrative    Not on file        Family Psychiatric History:     Family History[4]    Medical History Reviewed by provider this encounter:  " Meds  Problems  Med Hx  Surg Hx  Fam Hx          Objective   There were no vitals taken for this visit.     Mental Status Evaluation:    Appearance age appropriate, casually dressed, bearded   Behavior cooperative, calm   Speech normal rate, normal volume   Mood euthymic   Affect normal range and intensity, appropriate   Thought Processes organized, goal directed   Thought Content no overt delusions   Perceptual Disturbances: no auditory hallucinations, no visual hallucinations   Abnormal Thoughts  Risk Potential Suicidal ideation - None  Homicidal ideation - None  Potential for aggression - No   Orientation grossly oriented   Memory recent and remote memory grossly intact   Consciousness alert and awake   Attention Span Concentration Span attention span and concentration are age appropriate   Intellect appears to be of average intelligence   Insight intact   Judgement intact   Muscle Strength and  Gait unable to assess today due to virtual visit   Motor activity unable to assess today due to virtual visit   Language no difficulty naming common objects   Fund of Knowledge adequate knowledge of current events       Laboratory Results: I have personally reviewed all pertinent laboratory/tests results        Suicide/Homicide Risk Assessment:    Risk of Harm to Self:  Based on today's assessment, maya presents the following risk of harm to self: none    Risk of Harm to Others:  Based on today's assessment, maya presents the following risk of harm to others: none    The following interventions are recommended: Continue medication management.    Psychotherapy Provided:     Individual psychotherapy provided: No    Treatment Plan:    Completed and signed during the session: Yes - Treatment Plan done but not signed at time of office visit due to: Plan reviewed by video and verbal consent given due to virtual visit. Treatment Plan sent to patient via [employer] for signature.    Goals: Progress towards Treatment Plan  goals - stable.    Depression Follow-up Plan Completed: Not applicable    Note Share:        Administrative Statements   Administrative Statements   Encounter provider Nayana Simmons MD    The Patient is located at Home and in the following state in which I hold an active license [place].    The patient was identified by name and date of birth. Kimani Floyd was informed that this is a telemedicine visit and that the visit is being conducted through the Epic Embedded platform. He agrees to proceed..  My office door was closed. No one else was in the room.  He acknowledged consent and understanding of privacy and security of the video platform. The patient has agreed to participate and understands they can discontinue the visit at any time.    I have spent a total time of 20 minutes in caring for this patient on the day of the visit/encounter including Risks and benefits of tx options, Instructions for management, Documenting in the medical record, and Reviewing/placing orders in the medical record (including tests, medications, and/or procedures), not including the time spent for establishing the audio/video connection.    Visit Time  Face to face  Visit Start Time: 8.00 am   Visit Stop Time: 8.09 am   Total Visit Duration: 20 minutes total spent in patient care        Nayana Simmons MD 06/09/25       [1] No past medical history on file.  [2] No past surgical history on file.  [3] Not on File  [4] No family history on file.

## 2025-06-09 NOTE — BH TREATMENT PLAN
TREATMENT PLAN (Medication Management Only)        [facility]    Name and Date of Birth:  Kimani Floyd 69 y.o. 1955  MRN: [id number]  Date of Treatment Plan: June 9, 2025  Diagnosis/Diagnoses:    1. Generalized anxiety disorder    2. Major depressive disorder, recurrent episode, moderate (HCC)    3. Insomnia, unspecified type      Strengths/Personal Resources for Self-Care: taking medications as prescribed, independence, motivation for treatment.  Area/Areas of need (in own words): anxiety, depression, sleep problems  1. Long Term Goal:   maintain mood stability.  Target Date:6 months - 12/9/2025  Person/Persons responsible for completion of goal: Kimani  2.  Short Term Objective (s) - How will we reach this goal?:   A.  Provider new recommended medication/dosage changes and/or continue medication(s): continue current medications as prescribed Celexa, Wellbutrin XL, Neurontin, and Doxepin.  B.  N/A.  C.  N/A.  Target Date:6 months - 12/9/2025  Person/Persons Responsible for Completion of Goal: Kimani  Progress Towards Goals: stable  Treatment Modality: medication management every 6 months  Review due 180 days from date of this plan: 6 months - 12/9/2025  Expected length of service: ongoing treatment unless revised  My Physician/PA/NP and I have developed this plan together and I agree to work on the goals and objectives. I understand the treatment goals that were developed for my treatment.   Electronic Signatures: on file (unless signed below)    Nayana Simmons MD 06/09/25

## 2025-06-09 NOTE — ASSESSMENT & PLAN NOTE
Orders:    gabapentin (NEURONTIN) 600 MG tablet; Take 1 PO TID    doxepin (SINEquan) 75 MG capsule; Take 1 PO Q HS    citalopram (CeleXA) 40 mg tablet; TAKE 1 TABLET BY MOUTH EVERY DAY    buPROPion (WELLBUTRIN XL) 300 mg 24 hr tablet; TAKE 1 TABLET BY MOUTH EVERY DAY

## 2025-08-21 ENCOUNTER — TELEPHONE (OUTPATIENT)
Age: 70
End: 2025-08-21